# Patient Record
Sex: MALE | Race: WHITE | NOT HISPANIC OR LATINO | URBAN - METROPOLITAN AREA
[De-identification: names, ages, dates, MRNs, and addresses within clinical notes are randomized per-mention and may not be internally consistent; named-entity substitution may affect disease eponyms.]

---

## 2017-09-25 ENCOUNTER — COMMUNICATION - HEALTHEAST (OUTPATIENT)
Dept: NEUROSURGERY | Facility: CLINIC | Age: 47
End: 2017-09-25

## 2017-09-25 ENCOUNTER — AMBULATORY - HEALTHEAST (OUTPATIENT)
Dept: NEUROSURGERY | Facility: CLINIC | Age: 47
End: 2017-09-25

## 2017-09-25 DIAGNOSIS — M79.606 LEG PAIN: ICD-10-CM

## 2017-10-05 ENCOUNTER — COMMUNICATION - HEALTHEAST (OUTPATIENT)
Dept: FAMILY MEDICINE | Facility: CLINIC | Age: 47
End: 2017-10-05

## 2017-10-06 ENCOUNTER — HOSPITAL ENCOUNTER (OUTPATIENT)
Dept: MRI IMAGING | Facility: CLINIC | Age: 47
Discharge: HOME | End: 2017-10-06
Attending: NEUROLOGICAL SURGERY

## 2017-10-06 DIAGNOSIS — M79.606 LEG PAIN: ICD-10-CM

## 2017-10-17 ENCOUNTER — OFFICE VISIT - HEALTHEAST (OUTPATIENT)
Dept: NEUROSURGERY | Facility: CLINIC | Age: 47
End: 2017-10-17

## 2017-10-17 DIAGNOSIS — M43.16 SPONDYLOLISTHESIS, LUMBAR REGION: ICD-10-CM

## 2017-10-17 DIAGNOSIS — M51.16 LUMBAR DISC HERNIATION WITH RADICULOPATHY: ICD-10-CM

## 2017-10-17 ASSESSMENT — MIFFLIN-ST. JEOR: SCORE: 1780.25

## 2018-06-27 ENCOUNTER — COMMUNICATION - HEALTHEAST (OUTPATIENT)
Dept: NEUROSURGERY | Facility: CLINIC | Age: 48
End: 2018-06-27

## 2018-06-28 ENCOUNTER — AMBULATORY - HEALTHEAST (OUTPATIENT)
Dept: NEUROSURGERY | Facility: CLINIC | Age: 48
End: 2018-06-28

## 2018-06-28 DIAGNOSIS — M54.9 BACK PAIN: ICD-10-CM

## 2021-05-31 VITALS — HEIGHT: 75 IN | BODY MASS INDEX: 22.88 KG/M2 | WEIGHT: 184 LBS

## 2021-06-13 NOTE — PROGRESS NOTES
Da is here to f/u on a new lumbar MRI.he still has some occsasional shooting pain and numbness in right foot. He is now also getting symptoms in left foot but not as severe. Bilateral knee pain and minor back pain as well.   ELVIS today is 28%  MARIA EUGENIA Garcia

## 2021-06-13 NOTE — PROGRESS NOTES
Diagnoses and all orders for this visit:    Spondylolisthesis, lumbar region    Lumbar disc herniation with radiculopathy  -     Ambulatory referral to Physical Therapy          It was a pleasure to see Mr. Da Jimenez today in followup of his surgery with me for:     Procedure Date: 3/24/2016; RIGHT L5-S1 MINIMALLY INVASIVE MICRODISCECTOMY WITH METRIX   Findings:   Right L5-S1 disc extrusion with cephalad migration and impingement of right L5 nerve root against right L5 pedicle; 3 separate fragments removed        ELVIS preop  (3/11/16) was 32%  ELVIS  9/14/16 was 10% at 6 months postop  ELVIS 10/17/17 is 28% at 18 months postop       History of Present Illness:      Da Jimenez is a very pleasant 45 y.o. male with grade 1 L4-5 spondylolisthesis with L4 pars defects, and L5-S1 spondylosis, with prior severe right L5 radiculopathy referrable to a right L5-S1 disc herniation with cephalad migration and impingement of the exiting right L5 nerve root. Prior to surgery, he had a right foot drop and his EHL had no movement. He underwent a right L5-S1 MIS KENYA on 3/24/16 and was discharged home the same day with no complications. He additionally has an L4-L5 spondylolisthesis but does not have significant back pain and did not wish to undergo any treatment of the spondylolisthesis at this time. He understood that the spondylolisthesis may worsen over time and require intervention at L4-L5.       Today at 18 months post-op he tells me that his pain is significantly improved from prior to surgery. But he has started to have left leg symptoms in similar distribution to right, although no weakness on left. He previously had a right foot drop prior to surgery that improved with surgery.    ROS: left and right leg numbness/tingling in great toe, lateral thigh and calf, no new weakness, no bowel/bladder dysfunction  Past Medical History/Surgical/Social/Family unchanged  IMAGING: MRI lumbar spine with contrast 10/6/17-  significant improvement in right L5-S1 lateral recess stenosis; no focal worsening of any existing pathology except increased loss of disc height at L5-S1. Bilateral L5-S1 foraminal stenosis mild to moderate.         On exam, he still has some numbness and tingling in the right foot and big toe and his big toe is still mildly weak at 4/5, but his right dorsiflexion is normal and is strongly 5/5. He is strongly 5/5 on right foot eversion now. Baseline spondylolisthesis low grade back pain is unchanged from prior to surgery.      PLAN:  The MRI does not provide a clear source for left L5 radicular pain compared to his prior MRI when he had no left sided pain. He has left L5-S1 foraminal stenosis laterally>medially, which is the likely source.  I think he will need an L4-L5, L5-S1 instrumented fusion if he has worsening back pain or radicular symptoms. If he had worsening left-sided pain or weakness, we could consider a decompression there but his spine is degenerating much faster than his age, and I don't think a decompression surgery would have a lasting effect for him.  He would like to start PT- I have ordered this.     He will call us if his symptoms become significant or worsen at which time he would need a new MRI lumbar spine.     He is welcome to followup at [facility] with Dr. Romero or Dr. Arzola or I would be happy to see him at the [facility].              I spent 25 minutes in patient care with greater than 50% spent in counseling and/or coordination of care.